# Patient Record
Sex: FEMALE | Race: WHITE | Employment: FULL TIME | URBAN - METROPOLITAN AREA
[De-identification: names, ages, dates, MRNs, and addresses within clinical notes are randomized per-mention and may not be internally consistent; named-entity substitution may affect disease eponyms.]

---

## 2017-11-09 ENCOUNTER — HOSPITAL ENCOUNTER (OUTPATIENT)
Dept: ENDOSCOPY | Age: 37
Discharge: HOME | End: 2017-11-09
Attending: INTERNAL MEDICINE | Admitting: INTERNAL MEDICINE

## 2017-11-09 LAB — HCG(URINE) PREGNANCY TEST: NEGATIVE

## 2017-11-09 RX ORDER — SODIUM CHLORIDE 0.9 % (FLUSH) 0.9 %
10 SYRINGE (ML) INJECTION EVERY 12 HOURS SCHEDULED
Status: CANCELLED | OUTPATIENT
Start: 2017-11-09

## 2017-11-09 RX ORDER — SODIUM CHLORIDE 9 MG/ML
INJECTION, SOLUTION INTRAVENOUS CONTINUOUS
Status: CANCELLED | OUTPATIENT
Start: 2017-11-09

## 2017-11-09 RX ORDER — SODIUM CHLORIDE 0.9 % (FLUSH) 0.9 %
10 SYRINGE (ML) INJECTION PRN
Status: CANCELLED | OUTPATIENT
Start: 2017-11-09

## 2017-11-09 ASSESSMENT — ENCOUNTER SYMPTOMS: SHORTNESS OF BREATH: 0

## 2017-11-09 NOTE — ANESTHESIA POST-OP
[facility]est[facility]iology  Post-Anesthesia Note       Name:  Gloria Gonzales                                         Age:  39 y.o. MRN:  [id number]     Last Vitals & Oxygen Saturation: There were no vitals taken for this visit. No data found. Level of consciousness:  Awake, alert    Respiratory: Respirations easy, no distress. Stable. Cardiovascular: Hemodynamically stable. Hydration: Adequate. PONV: Adequately managed. Post-op pain: Adequately controlled. Post-op assessment: Tolerated anesthetic well without complication. Complications:  None.     Emiliana Starr MD  November 9, 2017   1:27 PM

## 2017-11-09 NOTE — ANESTHESIA PRE-OP
complications:   Airway: Mallampati: II  TM distance: >3 FB   Neck ROM: full  Mouth opening: > = 3 FB Dental:          Pulmonary:       (-) pneumonia, COPD, asthma, shortness of breath, recent URI and sleep apnea                           Cardiovascular:  Exercise tolerance: good (>4 METS),       (-) pacemaker, hypertension, valvular problems/murmurs, past MI, CAD, CABG/stent, dysrhythmias,  angina,  CHF, orthopnea, PND and  CLEMENS      Rhythm: regular  Rate: normal                    Neuro/Psych:   (+) neuromuscular disease:,    (-) seizures, TIA, CVA, headaches and psychiatric history           GI/Hepatic/Renal:   (+) GERD:,      (-) hiatal hernia, PUD, hepatitis, liver disease and bowel prep       Endo/Other:    (+) : arthritis:. (-) hypothyroidism, hyperthyroidism, blood dyscrasia, no Type II DM, no Type I DM               Abdominal:           Vascular:                                      Anesthesia Plan      MAC     ASA 2       Induction: intravenous. Anesthetic plan and risks discussed with patient. Plan discussed with CRNA. DOS STAFF ADDENDUM:    Pt seen and examined, chart reviewed (including anesthesia, drug and allergy history). No interval changes to history and physical examination. Anesthetic plan, risks, benefits, alternatives, and personnel involved discussed with patient. Patient verbalized an understanding and agrees to proceed.       Shaun Ruelas MD  November 9, 2017  6:47 AM

## 2022-11-10 NOTE — PROGRESS NOTES
Place patient label inside box (if no patient label, complete below)  Name:  :  MR#:     Tyree Mart / PROCEDURE  I (we)Elaine Bing (Patient Name) authorize Dilma Ken DPM (Provider / Byron Amaya) and/or such assistants as may be selected by him/her, to perform the following operation/procedure(s): RIGHT 1ST METATARSAL PHALANGEAL JOINT ARTHRODESIS, REMOVAL OF HARDWARE       Note: If unable to obtain consent prior to an emergent procedure, document the emergent reason in the medical record. This procedure has been explained to my (our) satisfaction and included in the explanation was: The intended benefit, nature, and extent of the procedure to be performed; The significant risks involved and the probability of success; Alternative procedures and methods of treatment; The dangers and probable consequences of such alternatives (including no procedure or treatment); The expected consequences of the procedure on my future health; Whether other qualified individuals would be performing important surgical tasks and/or whether  would be present to advise or support the procedure. I (we) understand that there are other risks of infection and other serious complications in the pre-operative/procedural and postoperative/procedural stages of my (our) care. I (we) have asked all of the questions which I (we) thought were important in deciding whether or not to undergo treatment or diagnosis. These questions have been answered to my (our) satisfaction. I (we) understand that no assurance can be given that the procedure will be a success, and no guarantee or warranty of success has been given to me (us).     It has been explained to me (us) that during the course of the operation/procedure, unforeseen conditions may be revealed that necessitate extension of the original procedure(s) or different procedure(s) than those set forth in Paragraph 1. I (we) authorize and request that the above-named physician, his/her assistants or his/her designees, perform procedures as necessary and desirable if deemed to be in my (our) best interest.     Revised 8/2/2021                                                                          Page 1 of 2       I acknowledge that health care personnel may be observing this procedure for the purpose of medical education or other specified purposes as may be necessary as requested and/or approved by my (our) physician. I (we) consent to the disposal by the hospital Pathologist of the removed tissue, parts or organs in accordance with hospital policy. I do ____ do not ____ consent to the use of a local infiltration pain blocking agent that will be used by my provider/surgical provider to help alleviate pain during my procedure. I do ____ do not ____ consent to an emergent blood transfusion in the case of a life-threatening situation that requires blood components to be administered. This consent is valid for 24 hours from the beginning of the procedure. This patient does ____ or does not ____ currently have a DNR status/order. If DNR order is in place, obtain Addendum to the Surgical Consent for ALL Patients with a DNR Order to address iron-operative status for limited intervention or DNR suspension.      I have read and fully understand the above Consent for Operation/Procedure and that all blanks were completed before I signed the consent.   _____________________________       _____________________      ____/____am/pm  Signature of Patient or legal representative      Printed Name / Relationship            Date / Time   ____________________________       _____________________      ____/____am/pm  Witness to Signature                                    Printed Name                    Date / Time    If patient is unable to sign or is a minor, complete the following)  Patient is a minor, ____ years of age, or unable to sign because:   ______________________________________________________________________________________________    If a phone consent is obtained, consent will be documented by using two health care professionals, each affirming that the consenting party has no questions and gives consent for the procedure discussed with the physician/provider.   _____________________          ____________________       _____/_____am/pm   2nd witness to phone consent        Printed name           Date / Time    Informed Consent:  I have provided the explanation described above in section 1 to the patient and/or legal representative.  I have provided the patient and/or legal representative with an opportunity to ask any questions about the proposed operation/procedure.   ___________________________          ____________________         ____/____am/pm  Provider / Proceduralist                            Printed name            Date / Time  Revised 8/2/2021                                                                      Page 2 of 2

## 2022-11-11 ENCOUNTER — ANESTHESIA EVENT (OUTPATIENT)
Dept: OPERATING ROOM | Age: 42
End: 2022-11-11
Payer: COMMERCIAL

## 2022-11-11 RX ORDER — LISINOPRIL 40 MG/1
40 TABLET ORAL DAILY
COMMUNITY

## 2022-11-11 NOTE — PROGRESS NOTES
[facility] PRE-SURGICAL TESTING INSTRUCTIONS                      PRIOR TO PROCEDURE DATE:    1. PLEASE FOLLOW ANY INSTRUCTIONS GIVEN TO YOU PER YOUR SURGEON. 2. Arrange for someone to drive you home and be with you for the first 24 hours after discharge for your safety after your procedure for which you received sedation. Ensure it is someone we can share information with regarding your discharge. NOTE: At this time ONLY 2 ADULTS may accompany you   One person MUST stay at hospital entire time if outpatient surgery      3. You must contact your surgeon for instructions IF:  You are taking any blood thinners, aspirin, anti-inflammatory or vitamins. There is a change in your physical condition such as a cold, fever, rash, cuts, sores, or any other infection, especially near your surgical site. 4. Do not drink alcohol the day before or day of your procedure. Do not use any recreational marijuana at least 24 hours or street drugs (heroin, cocaine) at minimum 5 days prior to your procedure. 5. A Pre-Surgical History and Physical MUST be completed WITHIN 30 DAYS OR LESS prior to your procedure. by your Physician or an Urgent Care        THE DAY OF YOUR PROCEDURE:  1. Follow instructions for ARRIVAL TIME as DIRECTED BY YOUR SURGEON. 2. Enter the MAIN entrance from [employer] and follow the signs to the free Parking [employer] (offered free of charge 7 am-5pm). 3. Enter the Main Entrance of the hospital (do not enter from the lower level of the parking garage). Upon entrance, check in with the  at the surgical information desk on your LEFT. Bring your insurance card and photo ID to register      4. DO NOT EAT ANYTHING 8 hours prior to arrival for surgery. You may have up to 8 ounces of water 4 hours prior to your arrival for surgery.    NOTE: ALL Gastric, Bariatric & Bowel surgery patients - you MUST follow your surgeon's instructions regarding eating/ drinking as you will have very specific instructions to follow. If you did not receive these, call your surgeon's office immediately. 5. MEDICATIONS:  Take the following medications with a SMALL sip of water: XANAX NEXIUM NIFEDIPINE METOPROLOL  Use your usual dose of inhalers the morning of surgery. BRING your rescue inhaler with you to hospital.   Anesthesia does NOT want you to take insulin the morning of surgery. They will control your blood sugar while you are at the hospital. Please contact your ordering physician for instructions regarding your insulin the night before your procedure. If you have an insulin pump, please keep it set on basal rate. Bariatric patient's call your surgeon if on diabetic medications as some may need to be stopped 1 week prior to surgery    6. Do not swallow additional water when brushing teeth. No gum, candy, mints, or ice chips. Refrain from smoking or at least decrease the amount on day of surgery. 7. Morning of surgery:   Take a shower with an antibacterial soap (i.e., Safeguard or Dial) OR your physician may have instructed you to use Hibiclens. Dress in loose, comfortable clothing appropriate for redressing after your procedure. Do not wear jewelry (including body piercings), make-up (especially NO eye make-up), fingernail polish (NO toenail polish if foot/leg surgery), lotion, powders, or metal hairclips. Do not shave or wax for 72 hours prior to procedure near your operative site. Shaving with a razor can irritate your skin and make it easier to develop an infection. On the day of your procedure, any hair that needs to be removed near the surgical site will be 'clipped' by a healthcare worker using a special clipper designed to avoid skin irritation. 8. Dentures, glasses, or contacts will need to be removed before your procedure. Bring cases for your glasses, contacts, dentures, or hearing aids to protect them while you are in surgery.       9. If you use a CPAP, please bring it with you on the day of your procedure. 10. We recommend that valuable personal belongings such as cash, cell phones, e-tablets, or jewelry, be left at home during your stay. The hospital will not be responsible for valuables that are not secured in the hospital safe. However, if your insurance requires a co-pay, you may want to bring a method of payment, i.e., Check or credit card, if you wish to pay your co-pay the day of surgery. 11. If you are to stay overnight, you may bring a bag with personal items. Please have any large items you may need brought in by your family after your arrival to your hospital room. 12. If you have a Living Will or Durable Power of , please bring a copy on the day of your procedure. How we keep you safe and work to prevent surgical site infections:   1. Health care workers should always check your ID bracelet to verify your name and birth date. You will be asked many times to state your name, date of birth, and allergies. 2. Health care workers should always clean their hands with soap or alcohol gel before providing care to you. It is okay to ask anyone if they cleaned their hands before they touch you. 3. You will be actively involved in verifying the type of procedure you are having and ensuring the correct surgical site. This will be confirmed multiple times prior to your procedure. Do NOT maria dolores your surgery site UNLESS instructed to by your surgeon. 4. When you are in the operating room, your surgical site will be cleansed with a special soap, and in most cases, you will be given an antibiotic before the surgery begins. What to expect AFTER your procedure? 1. Immediately following your procedure, your will be taken to the PACU for the first phase of your recovery. Your nurse will help you recover from any potential side effects of anesthesia, such as extreme drowsiness, changes in your vital signs or breathing patterns. Nausea, headache, muscle aches, or sore throat may also occur after anesthesia. Your nurse will help you manage these potential side effects. 2. For comfort and safety, arrange to have someone at home with you for the first 24 hours after discharge. 3. You and your family will be given written instructions about your diet, activity, dressing care, medications, and return visits. 4. Once at home, should issues with nausea, pain, or bleeding occur, or should you notice any signs of infection, you should call your surgeon. 5. Always clean your hands before and after caring for your wound. Do not let your family touch your surgery site without cleaning their hands. 6. Narcotic pain medications can cause significant constipation. You may want to add a stool softener to your postoperative medication schedule or speak to your surgeon on how best to manage this SIDE EFFECT. SPECIAL INSTRUCTIONS NONE    Thank you for allowing us to care for you. We strive to exceed your expectations in the delivery of care and service provided to you and your family. If you need to contact the [place] [address] staff for any reason, please call us at [phone]    Instructions reviewed with patient during preadmission testing phone interview.   Jonn Koyanagi, RN.11/11/2022 .9:14 AM      ADDITIONAL EDUCATIONAL INFORMATION REVIEWED PER PHONE WITH YOU AND/OR YOUR FAMILY:  No Hibiclens® Bathing Instructions   Yes Antibacterial Soap

## 2022-11-14 ENCOUNTER — ANESTHESIA (OUTPATIENT)
Dept: OPERATING ROOM | Age: 42
End: 2022-11-14
Payer: COMMERCIAL

## 2022-11-14 ENCOUNTER — HOSPITAL ENCOUNTER (OUTPATIENT)
Age: 42
Setting detail: OUTPATIENT SURGERY
Discharge: HOME | End: 2022-11-14
Attending: PODIATRIST | Admitting: PODIATRIST
Payer: COMMERCIAL

## 2022-11-14 ENCOUNTER — APPOINTMENT (OUTPATIENT)
Dept: GENERAL RADIOLOGY | Age: 42
End: 2022-11-14
Attending: PODIATRIST
Payer: COMMERCIAL

## 2022-11-14 VITALS
BODY MASS INDEX: 25.56 KG/M2 | HEIGHT: 60 IN | RESPIRATION RATE: 14 BRPM | HEART RATE: 74 BPM | WEIGHT: 130.2 LBS | OXYGEN SATURATION: 97 % | TEMPERATURE: 97.7 F | DIASTOLIC BLOOD PRESSURE: 88 MMHG | SYSTOLIC BLOOD PRESSURE: 119 MMHG

## 2022-11-14 DIAGNOSIS — G89.18 POST-OP PAIN: Primary | ICD-10-CM

## 2022-11-14 LAB — PREGNANCY, URINE: NEGATIVE

## 2022-11-14 PROCEDURE — 2580000003 HC RX 258: Performed by: NURSE ANESTHETIST, CERTIFIED REGISTERED

## 2022-11-14 PROCEDURE — C1713 ANCHOR/SCREW BN/BN,TIS/BN: HCPCS | Performed by: PODIATRIST

## 2022-11-14 PROCEDURE — 2580000003 HC RX 258: Performed by: PODIATRIST

## 2022-11-14 PROCEDURE — 7100000010 HC PHASE II RECOVERY - FIRST 15 MIN: Performed by: PODIATRIST

## 2022-11-14 PROCEDURE — 3600000014 HC SURGERY LEVEL 4 ADDTL 15MIN: Performed by: PODIATRIST

## 2022-11-14 PROCEDURE — 2500000003 HC RX 250 WO HCPCS: Performed by: NURSE ANESTHETIST, CERTIFIED REGISTERED

## 2022-11-14 PROCEDURE — 6360000002 HC RX W HCPCS: Performed by: ANESTHESIOLOGY

## 2022-11-14 PROCEDURE — 2500000003 HC RX 250 WO HCPCS: Performed by: PODIATRIST

## 2022-11-14 PROCEDURE — 6360000002 HC RX W HCPCS: Performed by: PODIATRIST

## 2022-11-14 PROCEDURE — 6370000000 HC RX 637 (ALT 250 FOR IP)

## 2022-11-14 PROCEDURE — 2709999900 HC NON-CHARGEABLE SUPPLY: Performed by: PODIATRIST

## 2022-11-14 PROCEDURE — 2720000010 HC SURG SUPPLY STERILE: Performed by: PODIATRIST

## 2022-11-14 PROCEDURE — 3600000004 HC SURGERY LEVEL 4 BASE: Performed by: PODIATRIST

## 2022-11-14 PROCEDURE — 7100000011 HC PHASE II RECOVERY - ADDTL 15 MIN: Performed by: PODIATRIST

## 2022-11-14 PROCEDURE — 73630 X-RAY EXAM OF FOOT: CPT

## 2022-11-14 PROCEDURE — 3700000001 HC ADD 15 MINUTES (ANESTHESIA): Performed by: PODIATRIST

## 2022-11-14 PROCEDURE — 2580000003 HC RX 258: Performed by: ANESTHESIOLOGY

## 2022-11-14 PROCEDURE — 84703 CHORIONIC GONADOTROPIN ASSAY: CPT

## 2022-11-14 PROCEDURE — 6360000002 HC RX W HCPCS: Performed by: NURSE ANESTHETIST, CERTIFIED REGISTERED

## 2022-11-14 PROCEDURE — 3700000000 HC ANESTHESIA ATTENDED CARE: Performed by: PODIATRIST

## 2022-11-14 PROCEDURE — 7100000001 HC PACU RECOVERY - ADDTL 15 MIN: Performed by: PODIATRIST

## 2022-11-14 PROCEDURE — C1769 GUIDE WIRE: HCPCS | Performed by: PODIATRIST

## 2022-11-14 PROCEDURE — 7100000000 HC PACU RECOVERY - FIRST 15 MIN: Performed by: PODIATRIST

## 2022-11-14 DEVICE — LOCKING SCREW
Type: IMPLANTABLE DEVICE | Site: FOOT | Status: FUNCTIONAL
Brand: ANCHORAGE

## 2022-11-14 DEVICE — INJECTABLE KIT
Type: IMPLANTABLE DEVICE | Site: FOOT | Status: FUNCTIONAL
Brand: AUGMENT® INJECTABLE

## 2022-11-14 DEVICE — GRAFT BNE SUB 5CC VIABLE MTRX COMPRESSIBLE MOLD RDY TO USE: Type: IMPLANTABLE DEVICE | Site: FOOT | Status: FUNCTIONAL

## 2022-11-14 DEVICE — IMPLANTABLE DEVICE: Type: IMPLANTABLE DEVICE | Site: FOOT | Status: FUNCTIONAL

## 2022-11-14 DEVICE — HEADED SCREW
Type: IMPLANTABLE DEVICE | Site: FOOT | Status: FUNCTIONAL
Brand: MINI

## 2022-11-14 DEVICE — NON LOCKING SCREW
Type: IMPLANTABLE DEVICE | Site: FOOT | Status: FUNCTIONAL
Brand: ANCHORAGE

## 2022-11-14 DEVICE — PIN ANCHORAGE FIX: Type: IMPLANTABLE DEVICE | Site: FOOT | Status: FUNCTIONAL

## 2022-11-14 RX ORDER — MIDAZOLAM HYDROCHLORIDE 1 MG/ML
INJECTION INTRAMUSCULAR; INTRAVENOUS PRN
Status: DISCONTINUED | OUTPATIENT
Start: 2022-11-14 | End: 2022-11-14 | Stop reason: SDUPTHER

## 2022-11-14 RX ORDER — OXYCODONE HYDROCHLORIDE AND ACETAMINOPHEN 5; 325 MG/1; MG/1
1 TABLET ORAL EVERY 6 HOURS PRN
Qty: 28 TABLET | Refills: 0 | Status: SHIPPED | OUTPATIENT
Start: 2022-11-14 | End: 2022-11-21

## 2022-11-14 RX ORDER — FENTANYL CITRATE 50 UG/ML
INJECTION, SOLUTION INTRAMUSCULAR; INTRAVENOUS PRN
Status: DISCONTINUED | OUTPATIENT
Start: 2022-11-14 | End: 2022-11-14 | Stop reason: SDUPTHER

## 2022-11-14 RX ORDER — SODIUM CHLORIDE 0.9 % (FLUSH) 0.9 %
5-40 SYRINGE (ML) INJECTION EVERY 12 HOURS SCHEDULED
Status: DISCONTINUED | OUTPATIENT
Start: 2022-11-14 | End: 2022-11-14 | Stop reason: HOSPADM

## 2022-11-14 RX ORDER — IBUPROFEN 800 MG/1
800 TABLET ORAL 3 TIMES DAILY PRN
Qty: 90 TABLET | Refills: 0 | Status: SHIPPED | OUTPATIENT
Start: 2022-11-14

## 2022-11-14 RX ORDER — SODIUM CHLORIDE, SODIUM LACTATE, POTASSIUM CHLORIDE, CALCIUM CHLORIDE 600; 310; 30; 20 MG/100ML; MG/100ML; MG/100ML; MG/100ML
INJECTION, SOLUTION INTRAVENOUS CONTINUOUS PRN
Status: DISCONTINUED | OUTPATIENT
Start: 2022-11-14 | End: 2022-11-14 | Stop reason: SDUPTHER

## 2022-11-14 RX ORDER — SODIUM CHLORIDE 0.9 % (FLUSH) 0.9 %
5-40 SYRINGE (ML) INJECTION PRN
Status: DISCONTINUED | OUTPATIENT
Start: 2022-11-14 | End: 2022-11-14 | Stop reason: HOSPADM

## 2022-11-14 RX ORDER — SODIUM CHLORIDE 9 MG/ML
INJECTION, SOLUTION INTRAVENOUS PRN
Status: DISCONTINUED | OUTPATIENT
Start: 2022-11-14 | End: 2022-11-14 | Stop reason: HOSPADM

## 2022-11-14 RX ORDER — PROPOFOL 10 MG/ML
INJECTION, EMULSION INTRAVENOUS PRN
Status: DISCONTINUED | OUTPATIENT
Start: 2022-11-14 | End: 2022-11-14 | Stop reason: SDUPTHER

## 2022-11-14 RX ORDER — DEXAMETHASONE SODIUM PHOSPHATE 10 MG/ML
INJECTION, SOLUTION INTRAMUSCULAR; INTRAVENOUS PRN
Status: DISCONTINUED | OUTPATIENT
Start: 2022-11-14 | End: 2022-11-14 | Stop reason: SDUPTHER

## 2022-11-14 RX ORDER — HYDRALAZINE HYDROCHLORIDE 20 MG/ML
10 INJECTION INTRAMUSCULAR; INTRAVENOUS
Status: DISCONTINUED | OUTPATIENT
Start: 2022-11-14 | End: 2022-11-14 | Stop reason: HOSPADM

## 2022-11-14 RX ORDER — LIDOCAINE HYDROCHLORIDE 10 MG/ML
INJECTION, SOLUTION EPIDURAL; INFILTRATION; INTRACAUDAL; PERINEURAL PRN
Status: DISCONTINUED | OUTPATIENT
Start: 2022-11-14 | End: 2022-11-14 | Stop reason: HOSPADM

## 2022-11-14 RX ORDER — PROCHLORPERAZINE EDISYLATE 5 MG/ML
5 INJECTION INTRAMUSCULAR; INTRAVENOUS
Status: DISCONTINUED | OUTPATIENT
Start: 2022-11-14 | End: 2022-11-14 | Stop reason: HOSPADM

## 2022-11-14 RX ORDER — LIDOCAINE HYDROCHLORIDE 20 MG/ML
INJECTION, SOLUTION INFILTRATION; PERINEURAL PRN
Status: DISCONTINUED | OUTPATIENT
Start: 2022-11-14 | End: 2022-11-14 | Stop reason: SDUPTHER

## 2022-11-14 RX ORDER — FAMOTIDINE 10 MG/ML
INJECTION, SOLUTION INTRAVENOUS PRN
Status: DISCONTINUED | OUTPATIENT
Start: 2022-11-14 | End: 2022-11-14 | Stop reason: SDUPTHER

## 2022-11-14 RX ORDER — OXYCODONE HYDROCHLORIDE AND ACETAMINOPHEN 5; 325 MG/1; MG/1
1 TABLET ORAL
Status: COMPLETED | OUTPATIENT
Start: 2022-11-14 | End: 2022-11-14

## 2022-11-14 RX ORDER — MEPERIDINE HYDROCHLORIDE 25 MG/ML
12.5 INJECTION INTRAMUSCULAR; INTRAVENOUS; SUBCUTANEOUS AS NEEDED
Status: DISCONTINUED | OUTPATIENT
Start: 2022-11-14 | End: 2022-11-14 | Stop reason: HOSPADM

## 2022-11-14 RX ORDER — ONDANSETRON 4 MG/1
4 TABLET, FILM COATED ORAL EVERY 8 HOURS PRN
Qty: 21 TABLET | Refills: 0 | Status: SHIPPED | OUTPATIENT
Start: 2022-11-14 | End: 2022-11-21

## 2022-11-14 RX ORDER — ROCURONIUM BROMIDE 10 MG/ML
INJECTION, SOLUTION INTRAVENOUS PRN
Status: DISCONTINUED | OUTPATIENT
Start: 2022-11-14 | End: 2022-11-14 | Stop reason: SDUPTHER

## 2022-11-14 RX ORDER — ONDANSETRON 2 MG/ML
INJECTION INTRAMUSCULAR; INTRAVENOUS PRN
Status: DISCONTINUED | OUTPATIENT
Start: 2022-11-14 | End: 2022-11-14 | Stop reason: SDUPTHER

## 2022-11-14 RX ORDER — GABAPENTIN 300 MG/1
300 CAPSULE ORAL NIGHTLY
Qty: 30 CAPSULE | Refills: 0 | Status: SHIPPED | OUTPATIENT
Start: 2022-11-14 | End: 2022-12-14

## 2022-11-14 RX ORDER — DIPHENHYDRAMINE HYDROCHLORIDE 50 MG/ML
12.5 INJECTION INTRAMUSCULAR; INTRAVENOUS
Status: DISCONTINUED | OUTPATIENT
Start: 2022-11-14 | End: 2022-11-14 | Stop reason: HOSPADM

## 2022-11-14 RX ORDER — ONDANSETRON 2 MG/ML
4 INJECTION INTRAMUSCULAR; INTRAVENOUS
Status: COMPLETED | OUTPATIENT
Start: 2022-11-14 | End: 2022-11-14

## 2022-11-14 RX ORDER — SODIUM CHLORIDE, SODIUM LACTATE, POTASSIUM CHLORIDE, CALCIUM CHLORIDE 600; 310; 30; 20 MG/100ML; MG/100ML; MG/100ML; MG/100ML
INJECTION, SOLUTION INTRAVENOUS CONTINUOUS
Status: DISCONTINUED | OUTPATIENT
Start: 2022-11-14 | End: 2022-11-14 | Stop reason: HOSPADM

## 2022-11-14 RX ADMIN — LIDOCAINE HYDROCHLORIDE 100 MG: 20 INJECTION, SOLUTION INFILTRATION; PERINEURAL at 07:33

## 2022-11-14 RX ADMIN — FENTANYL CITRATE 100 MCG: 50 INJECTION, SOLUTION INTRAMUSCULAR; INTRAVENOUS at 10:23

## 2022-11-14 RX ADMIN — SODIUM CHLORIDE, SODIUM LACTATE, POTASSIUM CHLORIDE, AND CALCIUM CHLORIDE: .6; .31; .03; .02 INJECTION, SOLUTION INTRAVENOUS at 07:28

## 2022-11-14 RX ADMIN — PROPOFOL 50 MG: 10 INJECTION, EMULSION INTRAVENOUS at 07:40

## 2022-11-14 RX ADMIN — SODIUM CHLORIDE, SODIUM LACTATE, POTASSIUM CHLORIDE, AND CALCIUM CHLORIDE: .6; .31; .03; .02 INJECTION, SOLUTION INTRAVENOUS at 10:26

## 2022-11-14 RX ADMIN — ONDANSETRON 4 MG: 2 INJECTION INTRAMUSCULAR; INTRAVENOUS at 07:40

## 2022-11-14 RX ADMIN — ROCURONIUM BROMIDE 100 MG: 10 INJECTION INTRAVENOUS at 07:35

## 2022-11-14 RX ADMIN — CEFAZOLIN 2000 MG: 2 INJECTION, POWDER, FOR SOLUTION INTRAMUSCULAR; INTRAVENOUS at 07:28

## 2022-11-14 RX ADMIN — PROPOFOL 150 MG: 10 INJECTION, EMULSION INTRAVENOUS at 07:35

## 2022-11-14 RX ADMIN — FAMOTIDINE 20 MG: 10 INJECTION, SOLUTION INTRAVENOUS at 07:44

## 2022-11-14 RX ADMIN — SODIUM CHLORIDE, POTASSIUM CHLORIDE, SODIUM LACTATE AND CALCIUM CHLORIDE: 600; 310; 30; 20 INJECTION, SOLUTION INTRAVENOUS at 06:45

## 2022-11-14 RX ADMIN — MIDAZOLAM HYDROCHLORIDE 2 MG: 2 INJECTION, SOLUTION INTRAMUSCULAR; INTRAVENOUS at 07:28

## 2022-11-14 RX ADMIN — FENTANYL CITRATE 100 MCG: 50 INJECTION, SOLUTION INTRAMUSCULAR; INTRAVENOUS at 07:28

## 2022-11-14 RX ADMIN — ONDANSETRON 4 MG: 2 INJECTION INTRAMUSCULAR; INTRAVENOUS at 11:24

## 2022-11-14 RX ADMIN — DEXAMETHASONE SODIUM PHOSPHATE 4 MG: 10 INJECTION, SOLUTION INTRAMUSCULAR; INTRAVENOUS at 07:40

## 2022-11-14 RX ADMIN — OXYCODONE HYDROCHLORIDE AND ACETAMINOPHEN 1 TABLET: 5; 325 TABLET ORAL at 11:39

## 2022-11-14 RX ADMIN — SUGAMMADEX 200 MG: 100 INJECTION, SOLUTION INTRAVENOUS at 10:17

## 2022-11-14 ASSESSMENT — PAIN SCALES - GENERAL
PAINLEVEL_OUTOF10: 2
PAINLEVEL_OUTOF10: 4
PAINLEVEL_OUTOF10: 3
PAINLEVEL_OUTOF10: 0

## 2022-11-14 ASSESSMENT — PAIN DESCRIPTION - LOCATION
LOCATION: FOOT

## 2022-11-14 ASSESSMENT — PAIN DESCRIPTION - ORIENTATION
ORIENTATION: RIGHT

## 2022-11-14 ASSESSMENT — PAIN DESCRIPTION - DESCRIPTORS
DESCRIPTORS: SORE
DESCRIPTORS: ACHING

## 2022-11-14 ASSESSMENT — LIFESTYLE VARIABLES: SMOKING_STATUS: 0

## 2022-11-14 NOTE — DISCHARGE INSTRUCTIONS
Dr. Ovidio Roque, S-[facility] [id number]  [address] [phone]    Post-Op Discharge Instructions    Fluids and Diet  1. Begin with clear liquids, bouillon, dry toast, soda crackers  2. If NOT nauseated, you may resume a regular diet when you desire    Medications  1. Take prescription medications only as directed  2. If pain is not severe, you may take the non-prescription medication that you normally take. 3. If any side effects or adverse reactions occur, discontinue the medication and contact your doctor. 4. Review the patient drug information leaflet, when provided, before you begin taking the medication    Ambulation and Activity  1. You are advised to go directly home from the hospital  2. YOU MAY NOT PUT ANY WEIGHT ON YOUR OPERATIVE FOOT. 3. Please use your crutches, knee scooter, walker  for assistance. Wear your surgical shoe or boot at all times   4. Do not lift or move heavy objects  5. Do not Drive    Post Anesthesia Instructions  1. Do not drive or operate machinery  2. Do not consume alcohol, tranquilizers, sleeping medications, or a non-prescription pain medication  3. Do not make important decisions  4. You should have someone home with you tonight    Care of the Operative Site  1. Keep your bandage clean, dry, and intact. 2. Do not shower  3. Do not remove bandage  4. Elevate your surgical foot and leg as much as possible to reduce swelling and discomfort for the first 72 hours, above the level of the heart. 5. Apply ice bag wrapped in thick towel over bandage 20 minutes of every hour for the first 72 hours while awake. If the dressing is too thick to feel the ice, you may place the ice behind the knee of your surgical foot.   6. Do not attempt to put anything between the cast or dressing and skin, some itching is normal    Special Instructions:  [address]    There are potential side effects of anesthesia or sedation you may experience for the first 24 hours. These side effects include:    Confusion or Memory loss, Dizziness, or Delayed Reaction Times   [x]A responsible person should be with you for the next 24 hours. Do not operate any vehicles (automobiles, bicycles, motorcycles) or power tools or machinery for 24 hours. Do not sign any legal documents or make any legal decisions for 24 hours. Do not drink alcohol for 24 hours or while taking narcotic pain medication. Nausea    [x]Start with light diet and progress to your normal diet as you feel like eating. However, if you experience nausea or repeated episodes of vomiting which persist beyond 12-24 hours, notify your physician. Once nausea has passed, remember to keep drinking fluids. Difficulty Passing Urine  [x]Drink extra amounts of fluid today. Notify your physician if you have not urinated within 8 hours after your procedure or you feel uncomfortable. Irritated Throat from a Breathing Tube  [x]Drink extra amounts of fluid today. Lozenges may help. Muscle Aches  [x]You may experience some generalized body aches as your muscles recover from medications used to relax them during surgery. These will gradually subside. MEDICATION INSTRUCTIONS:  [x]Prescription(S) x  5  sent with you. Use as directed. When taking pain medications, you may experience the side effect of dizziness or drowsiness. Do not drink alcohol or drive when taking these medications. []Prescription(S) x          Called to Pharmacy Name and location:    [x]Give the list of your medications to your primary care physician on your next visit. Keep your med list updated and carry it with in case of emergencies. [x] Narcotic pain medications can cause the side effect of significant constipation.   You may want to add a stool softener to your postoperative medication schedule or speak to your surgeon on how best to manage this side effect. NARCOTIC SAFETY:  Your pain medicine is only for you to take. Safely store your medicines. Store pills up high and out of reach of children and pets. Ensure safety caps are snapped tightly  Keep track of how many pills you have left    Unused medication can be disposed of by taking them to a drop-off box or take-back program that is authorized by the [facility]. Access to a site near you can be found on the [facility] website ([address] [facility]. [facility]). If you have a CPAP machine, it is very important that you use it daily during all periods of sleep and daytime rest during your recovery at home. Surgery and Anesthesia place a significant amount of stress on your body. Using your CPAP will help keep you safe and lessen the negative effects of that stress. FOLLOW-UP RECOVERY CARE:  [x]Call the office at [phone]  for follow-up appointment 1 week and problems    Watch for these possible complications, symptoms, or side effects of anesthesia. Call physician if they or any other problems occur:  Signs of INFECTION   > Fever over 100°     > Redness, swelling, hardness or warmth at the operative site   >Foul smelling or cloudy drainage at the operative site   Unrelieved PAIN  Unrelieved NAUSEA  Blood soaked dressing. (Some oozing may be normal)  Inability to urinate      Numb, pale, blue, cold or tingling extremity      Physician:  dr. Karen Dick    The above instructions were reviewed with patient/significant other.   The following additional patient specific information was reviewed with the patient/significant other:  [x]Procedure/physician specific instructions  [x]Medication information sheet(S) including potential side effects  []Azebs egress test  []Pain Ball management  []FAQ Catheter associated blood stream infections  []FAQ Surgical Site Infections  []Other-    I have read and understand the instructions given to me: ____________________________________________ (Patient/S.O. Signature)            Date/time 2022 11:12 AM         PACU:  [phone]   M-F 700 AM - 7 PM      SAME DAY SERVICES:  [phone] M-F 7AM-6PM        If you smoke STOP. We care about your health! Call your doctor immediately if you develop any of the followin. Fever over 100 degrees by mouth - take your temperature daily  2. Pain not relieved by medication ordered  3. Swelling, increased redness, warmth, or hardness around operative area  4. Numb, tingling or cold toes  5. Toe(s) become white or bluish  6. Bandage becomes wet, soiled, or blood soaked (a small amount of bleeding may be normal)  7. Increasing or progressive drainage from surgical area    Please follow up at your scheduled appointment. If you do not have an appointment scheduled or cannot remember the time and date, please call Dr. Rosana Min office to get it scheduled. You need to be seen within one week of surgery. Please call Dr. Rosana Min office at [phone] if you have any questions.

## 2022-11-14 NOTE — H&P
[facility]    [id number]    [facility] Same Day Surgery Update H & P  Department of General Surgery   Surgical Service   Pre-operative History and Physical  Last H & P within the last 30 days. DIAGNOSIS:   Hallux valgus, right [M20.11]    Procedure(s):  RIGHT 1ST METATARSAL PHALANGEAL JOINT ARTHRODESIS, REMOVAL OF HARDWARE  . History obtained from: Patient interview and EHR     HISTORY OF PRESENT ILLNESS:   The patient is a 36 y.o. female with c/o right foot pain in the setting of previous surgery. Their symptoms have been recalcitrant to conservative treatment and the patient presents today for the above procedure. Illness Screening: Patient denies fever, chills, worsening cough, or close contact with sick individuals. Past Medical History:        Diagnosis Date    Anxiety     Cancer ([address]     carcinoid tumor colon    Carcinoid syndrome ([address]     causes fever, chills, sweats, rapid heart rate    Esophageal disorder     \"does not contract properly\"    GERD (gastroesophageal reflux disease)     History of aspiration pneumonitis     Mixed connective tissue disease (HCC)     Nausea     chronic    Nausea & vomiting     Polymyositis ([address]     Prolonged emergence from general anesthesia      Past Surgical History:        Procedure Laterality Date    ADENOIDECTOMY      APPENDECTOMY  2016    AT [address]  01/01/2010    reduction    BUNIONECTOMY Right 2018    WITH HARDWARE R FOOT    CHOLECYSTECTOMY      LIVER SURGERY  10/17/2013    SMALL INTESTINE SURGERY  10/17/2013    TUMOR EXCISION  01/01/2008    AND 2ND SURGERY  OCT 27 2022  cholestiatoma right ear    TYMPANOSTOMY TUBE PLACEMENT             Medications Prior to Admission:      Prior to Admission medications    Medication Sig Start Date End Date Taking?  Authorizing Provider   Norgestim-Eth Estrad Triphasic (TRI-SPRINTEC PO) Take by mouth   Yes Historical Provider, MD   lisinopril (PRINIVIL;ZESTRIL) 40 MG tablet Take 40 mg by mouth daily   Yes Historical Provider, MD   METOPROLOL TARTRATE PO Take 25 mg by mouth Metoprolol xr   Yes Historical Provider, MD   FOLIC ACID PO Take 1 g by mouth daily   Yes Historical Provider, MD   Hydroxychloroquine Sulfate (PLAQUENIL PO) Take 200 mg by mouth   Yes Historical Provider, MD   esomeprazole (NEXIUM) 40 MG capsule TAKE 1 CAPSULE BY MOUTH TWO TIMES DAILY 8/16/16   Alita Cowden, MD   NIFEdipine (PROCARDIA XL) 30 MG CR tablet TAKE 1 TABLET BY MOUTH DAILY. 3/7/16   Alita Cowden, MD   ALPRAZolam [facility]) 1 MG tablet Take 1 mg by mouth as needed. Mostly at hs    Historical Provider, MD         Allergies:  Contrast [iodides], Ancef [cefazolin], and Morphine    PHYSICAL EXAM:      BP (!) 158/108   Pulse 81   Temp 98.6 °F (37 °C) (Oral)   Resp 16   Ht 5' (1.524 m)   Wt 130 lb 3.2 oz (59.1 kg)   LMP 10/31/2022   SpO2 100%   BMI 25.43 kg/m²      Airway:  Airway patent with no audible stridor    Heart:  Regular rate and rhythm, No murmur noted    Lungs:  No increased work of breathing, good air exchange, clear to auscultation bilaterally, no crackles or wheezing    Abdomen:  Soft, non-distended, non-tender, no masses palpated    ASSESSMENT AND PLAN    Patient is a 36 y.o. female with above specified procedure planned. 1.  The patients history and physical was obtained and signed off by the pre-admission testing department. Patient seen and focused exam done today- no new changes since last physical exam on 10/20/22    2. Access to ancillary services are available per request of the provider.     YOSVANY Bach - CNP     11/14/2022

## 2022-11-14 NOTE — ANESTHESIA PRE PROCEDURE
Department of Anesthesiology  Preprocedure Note       Name:  Nadiya Common   Age:  36 y.o.  :  1980                                          MRN:  [id number]         Date:  2022      Surgeon: Killian Chery):  Jeffrey Shea DPM    Procedure: Procedure(s):  RIGHT 1ST METATARSAL PHALANGEAL JOINT ARTHRODESIS, REMOVAL OF HARDWARE  . Medications prior to admission:   Prior to Admission medications    Medication Sig Start Date End Date Taking? Authorizing Provider   Norgestim-Eth Estrad Triphasic (TRI-SPRINTEC PO) Take by mouth   Yes Historical Provider, MD   lisinopril (PRINIVIL;ZESTRIL) 40 MG tablet Take 40 mg by mouth daily   Yes Historical Provider, MD   METOPROLOL TARTRATE PO Take 25 mg by mouth Metoprolol xr   Yes Historical Provider, MD   FOLIC ACID PO Take 1 g by mouth daily   Yes Historical Provider, MD   Hydroxychloroquine Sulfate (PLAQUENIL PO) Take 200 mg by mouth   Yes Historical Provider, MD   esomeprazole (NEXIUM) 40 MG capsule TAKE 1 CAPSULE BY MOUTH TWO TIMES DAILY 16   José Manuel Palmer MD   NIFEdipine (PROCARDIA XL) 30 MG CR tablet TAKE 1 TABLET BY MOUTH DAILY. 3/7/16   José Manuel Palmer MD   ALPRAZolam [facility]an) 1 MG tablet Take 1 mg by mouth as needed.  Mostly at hs    Historical Provider, MD       Current medications:    Current Facility-Administered Medications   Medication Dose Route Frequency Provider Last Rate Last Admin    ceFAZolin (ANCEF) 2,000 mg in sodium chloride 0.9 % 50 mL IVPB (mini-bag)  2,000 mg IntraVENous Once Jeffrey Shea DPM        lactated ringers infusion   IntraVENous Continuous Thurl Sand,  mL/hr at 22 0645 New Bag at 22 0645    sodium chloride flush 0.9 % injection 5-40 mL  5-40 mL IntraVENous 2 times per day Thurl Sand, DO        sodium chloride flush 0.9 % injection 5-40 mL  5-40 mL IntraVENous PRN Thurl Sand, DO        0.9 % sodium chloride infusion   IntraVENous PRN Thurl Sand, DO           Allergies: Allergies   Allergen Reactions    Contrast [Iodides]      Iv contrast dye only, analyphlaxis. Iodine on skin ok    Ancef [Cefazolin]      PATIENT STATES- \"Ok with cephalasporins. \"    Ancef iv extravasated was the issue -previously document    Morphine Itching     Peeling skin, itching         Problem List:    Patient Active Problem List   Diagnosis Code    Polymyositis ([address] M33.20    Anxiety state F41.1    Chronic steroid use [id number]    Small bowel mass K63.89    Liver mass, right lobe R16.0    Carcinoid tumor D3A.00       Past Medical History:        Diagnosis Date    Anxiety     Cancer [facility])     carcinoid tumor colon    Carcinoid syndrome (HCC)     causes fever, chills, sweats, rapid heart rate    Esophageal disorder     \"does not contract properly\"    GERD (gastroesophageal reflux disease)     History of aspiration pneumonitis     Mixed connective tissue disease ([address]     Nausea     chronic    Nausea & vomiting     Polymyositis ([address]     Prolonged emergence from general anesthesia        Past Surgical History:        Procedure Laterality Date    ADENOIDECTOMY      APPENDECTOMY  2016    AT [address] [facility]  01/01/2010    reduction    BUNIONECTOMY Right 2018    WITH HARDWARE R FOOT    CHOLECYSTECTOMY      EAR SURGERY      Cholesteatoma removed X2    LIVER SURGERY  10/17/2013    SMALL INTESTINE SURGERY  10/17/2013    TUMOR EXCISION  01/01/2008    AND 2ND SURGERY  OCT 27 2022  cholestiatoma right ear    TYMPANOSTOMY TUBE PLACEMENT         Social History:    Social History     Tobacco Use    Smoking status: Never    Smokeless tobacco: Never   Substance Use Topics    Alcohol use: Yes     Comment: Occasionally                                Counseling given: Not Answered      Vital Signs (Current):   Vitals:    11/11/22 0843 11/14/22 0600   BP:  (!) 158/108   Pulse:  81   Resp:  16   Temp:  98.6 °F (37 °C)   TempSrc:  Oral   SpO2:  100%   Weight: 130 lb (59 kg) 130 lb 3.2 oz (59.1 kg)   Height: 5' (1.524 m) 5' (1.524 m)                                              BP Readings from Last 3 Encounters:   11/14/22 (!) 158/108   12/01/16 130/86   08/11/16 132/86       NPO Status: Time of last liquid consumption: 0530 (Sip of water)                        Time of last solid consumption: 2000                        Date of last liquid consumption: 11/14/22                        Date of last solid food consumption: 11/13/22    BMI:   Wt Readings from Last 3 Encounters:   11/14/22 130 lb 3.2 oz (59.1 kg)   12/01/16 119 lb (54 kg)   08/11/16 124 lb 3.2 oz (56.3 kg)     Body mass index is 25.43 kg/m². CBC:   Lab Results   Component Value Date/Time    WBC 4.1 04/23/2015 03:39 PM    WBC 5.1 10/22/2013 09:38 AM    RBC 4.63 04/23/2015 03:39 PM    HGB 12.0 04/23/2015 03:39 PM    HCT 38.5 04/23/2015 03:39 PM    MCV 83.2 04/23/2015 03:39 PM    RDW 13.8 04/23/2015 03:39 PM     04/23/2015 03:39 PM       CMP:   Lab Results   Component Value Date/Time     04/23/2015 03:39 PM    K 4.0 04/23/2015 03:39 PM     04/23/2015 03:39 PM    CO2 26 04/23/2015 03:39 PM    BUN 10 04/23/2015 03:39 PM    CREATININE 0.71 04/23/2015 03:39 PM    GFRAA >60 10/22/2013 09:38 AM    GFRAA >60 05/20/2013 12:30 PM    AGRATIO 1.2 04/23/2015 03:39 PM    LABGLOM >60.0 04/23/2015 03:39 PM    GLUCOSE 89 04/23/2015 03:39 PM    PROT 8.2 04/23/2015 03:39 PM    CALCIUM 9.0 04/23/2015 03:39 PM    BILITOT 0.1 04/23/2015 03:39 PM    ALKPHOS 70 04/23/2015 03:39 PM    ALKPHOS 65 10/19/2013 05:18 AM    AST 19 04/23/2015 03:39 PM    ALT 18 04/23/2015 03:39 PM       POC Tests: No results for input(s): POCGLU, POCNA, POCK, POCCL, POCBUN, POCHEMO, POCHCT in the last 72 hours.     Coags:   Lab Results   Component Value Date/Time    PROTIME 12.7 10/11/2013 02:48 PM    INR 0.97 10/11/2013 02:48 PM    APTT 31.6 10/11/2013 02:48 PM       HCG (If Applicable):   Lab Results   Component Value Date    PREGTESTUR Negative 11/14/2022 ABGs: No results found for: PHART, PO2ART, RBC4LDP, LGI9HVA, BEART, E1OEJFBC     Type & Screen (If Applicable):  No results found for: LABABO, LABRH    Drug/Infectious Status (If Applicable):  No results found for: HIV, HEPCAB    COVID-19 Screening (If Applicable): No results found for: COVID19        Anesthesia Evaluation  Patient summary reviewed and Nursing notes reviewed no history of anesthetic complications:   Airway: Mallampati: II  TM distance: <3 FB   Neck ROM: full  Mouth opening: > = 3 FB   Dental: normal exam         Pulmonary: breath sounds clear to auscultation      (-) not a current smoker (never)                          ROS comment: covid 2 months ago     Cardiovascular:  Exercise tolerance: good (>4 METS),       (-) past MI      Rhythm: regular  Rate: normal           Beta Blocker:  Dose within 24 Hrs         Neuro/Psych:               GI/Hepatic/Renal:   (+) GERD: well controlled,           Endo/Other:    (+) : arthritis:., malignancy/cancer (hx of carcinoid colon   2015 removed  ). Abdominal:             Vascular: Other Findings:           Anesthesia Plan      general     ASA 3       Induction: intravenous. MIPS: Prophylactic antiemetics administered. Anesthetic plan and risks discussed with patient. Plan discussed with CRNA.     Attending anesthesiologist reviewed and agrees with Preprocedure content                Nestor Ocampo DO   11/14/2022

## 2022-11-14 NOTE — PROGRESS NOTES
Pt arrived form OR, s/p RIGHT 1ST METATARSAL PHALANGEAL JOINT ARTHRODESIS, REMOVAL OF HARDWARE - Right, report received form CRNA and Podiatry resident.   Hob elevated, ice chips offered

## 2022-11-14 NOTE — PROGRESS NOTES
Ambulatory Surgery/Procedure Discharge Note    Vitals:    11/14/22 1156   BP: 119/88   Pulse: 74   Resp: 14   Temp: 97.7 °F (36.5 °C)   SpO2: 97%   Blood pressure is lower then pre-op BP. Pt reports she is much more relaxed and her pain is in control. She did not feel light headed or dizzy after surgery. In: 3120 [P.O.:250; I.V.:1075]  Out: 600 [Urine:600]    Restroom use offered before discharge. Yes    Pain assessment:  none  Pain Level: 3        Patient discharged to home/self care. Patient discharged via wheel chair by transporter to waiting family/S.O.       11/14/2022 12:49 PM Pt and S.O./family states \"ready to go home\". Pt alert and oriented x4. IV removed. Denies N/V or pain. Dressing is clean dry and intact. Voided prior to discharge. Discharge instructions given to pt and friend with pt permission. Pt and friend verbalized understanding of all instructions. Left with all belongings, 5 prescriptions, and discharge instructions.

## 2022-11-14 NOTE — PROGRESS NOTES
Pt is alert and oriented X4. Call light in reach. Fabiana Bravo at bedside to take phone and wallet. Clothing bag to locked room. IV started with LR running. Pregnancy test was negative. Ancef on call to OR. It is listed on her allergy list but she stated she believes it was an IV that went bad. It caused burning at her IV start. She is very anxious and it worried about pain control for afterwards.

## 2022-11-14 NOTE — ANESTHESIA POSTPROCEDURE EVALUATION
Department of Anesthesiology  Postprocedure Note    Patient: Charli Wheatley  MRN: [id number]  YOB: 1980  Date of evaluation: 11/14/2022      Procedure Summary     Date: 11/14/22 Room / Location: [facility]    Anesthesia Start: 8789 Anesthesia Stop: 5059    Procedure: RIGHT 1ST METATARSAL PHALANGEAL JOINT ARTHRODESIS, REMOVAL OF HARDWARE (Right) Diagnosis:       Hallux valgus, right      (Hallux valgus, right [M20.11])    Surgeons: Elvia Spencer DPM Responsible Provider: Lee Alvares DO    Anesthesia Type: general ASA Status: 3          Anesthesia Type: No value filed.     Hiral Phase I: Hiral Score: 10    Hiral Phase II:        Anesthesia Post Evaluation    Patient location during evaluation: PACU  Patient participation: complete - patient participated  Level of consciousness: awake and alert  Pain score: 2  Airway patency: patent  Nausea & Vomiting: no nausea and no vomiting  Complications: no  Cardiovascular status: hemodynamically stable  Respiratory status: acceptable  Hydration status: stable

## 2022-11-14 NOTE — BRIEF OP NOTE
Brief Postoperative Note      Patient: Aleisha Maravilla  YOB: 1980  MRN: [id number]    Date of Procedure: 11/14/2022    Pre-Op Diagnosis: Hallux valgus, right [M20.11]    Post-Op Diagnosis: Same       Procedure(s):  RIGHT 1ST METATARSAL PHALANGEAL JOINT ARTHRODESIS, REMOVAL OF HARDWARE    Surgeon(s):  Kingston Green DPM    Assistant:  Resident: Huang Boyd DPM    Anesthesia: Choice    Injectables:  pre-op 20 cc of 1% lidocaine plain and post-op 20 cc of 0.5% marcaine plain     Hemostasis: pneumatic ankle tourniquet at 250 mmHg for 127 minutes    Materials: 3-0 Vicryl, 4-0 Vicryl, 4-0 Nylon  Bio4 and Augment  Clint 3.0 x 30 mm headed screw  [place] 5 hole straight plate  3.5 x 10 mm locking screw x2  3.5 x 12 mm locking screw x1  3.5 x 14 mm locking screw x1  3.0 x 18 mm non locking screw x1    Estimated Blood Loss (mL): less than 50     Complications: None    Specimens:   * No specimens in log *    Implants:  Implant Name Type Inv. Item Serial No.  Lot No. LRB No. Used Action   GRAFT BNE SUB 5CC VIABLE MTRX COMPRESSIBLE MOLD RDY TO USE - [id number]  GRAFT BNE SUB 5CC VIABLE MTRX COMPRESSIBLE MOLD RDY TO USE [id number] [employer] [facility]  Right 1 Implanted   GRAFT BNE SUB 3CC RHPDGF BB B TRICALCIUM PHSPTE RADPQ AUG - [id number]  GRAFT BNE SUB 3CC RHPDGF BB B TRICALCIUM PHSPTE RADPQ AUG  [employer] [facility]- [id number] Right 1 Implanted         Drains: * No LDAs found *    Findings: Soft bone noted to the proximal phalanx and 1st met head.  See op report for other findings    Electronically signed by Huang Boyd DPM on 11/14/2022 at 10:50 AM

## 2022-11-14 NOTE — PROGRESS NOTES
The patient was given a prescription for eliquis but Dr. Dean Vidal told the patient to take aspirin. This nurse called to confirm. Dr. Dean Vidal stated to take Aspirin 325mg daily. Do NOT take or file Eliquis prescription. Pt was called and updated. She understood and verbalized that she would take the aspirin NOT the eliquis.

## 2022-11-14 NOTE — PROGRESS NOTES
PACU Transfer to [facility]    Vitals:    11/14/22 1145   BP: 129/83   Pulse: 83   Resp: 12   Temp: 97.7 °F (36.5 °C)   SpO2: 97%         Intake/Output Summary (Last 24 hours) at 11/14/2022 1153  Last data filed at 11/14/2022 1152  Gross per 24 hour   Intake 1225 ml   Output 600 ml   Net 625 ml       Pain assessment:    Pain Level: 2    Patient transferred to care of [facility] RN.    11/14/2022 11:53 AM  Friend at MD's appointment, will be here soon

## 2022-11-15 NOTE — OP NOTE
Operative Note      Patient: Donn Downing  YOB: 1980  MRN: [id number]     Date of Procedure: 11/14/2022     Pre-Op Diagnosis: Hallux valgus, right [M20.11]     Post-Op Diagnosis: Same       Procedure(s):  RIGHT 1ST METATARSAL PHALANGEAL JOINT ARTHRODESIS, REMOVAL OF HARDWARE     Surgeon(s):  Rico Anderson DPM     Assistant:  Resident: Bella Crow DPM     Anesthesia: Choice     Injectables:  pre-op 20 cc of 1% lidocaine plain and post-op 20 cc of 0.5% marcaine plain      Hemostasis: pneumatic ankle tourniquet at 250 mmHg for 127 minutes     Materials: 3-0 Vicryl, 4-0 Vicryl, 4-0 Nylon  Bio4 and Augment  [place] 3.0 x 30 mm headed screw  Clint 5 hole straight plate  3.5 x 10 mm locking screw x2  3.5 x 12 mm locking screw x1  3.5 x 14 mm locking screw x1  3.0 x 18 mm non locking screw x1     Estimated Blood Loss (mL): less than 50      Complications: None     Specimens:   * No specimens in log *     Implants:  Implant Name Type Inv. Item Serial No.  Lot No. LRB No. Used Action   GRAFT BNE SUB 5CC VIABLE MTRX COMPRESSIBLE MOLD RDY TO USE - [id number]   GRAFT BNE SUB 5CC VIABLE MTRX COMPRESSIBLE MOLD RDY TO USE [id number] [employer] [facility]   Right 1 Implanted   GRAFT BNE SUB 3CC RHPDGF BB B TRICALCIUM PHSPTE RADPQ AUG - [id number]   GRAFT BNE SUB 3CC RHPDGF BB B TRICALCIUM PHSPTE RADPQ AUG   [facility] [id number] Right 1 Implanted          Drains: * No LDAs found *     Findings: Soft bone noted to the proximal phalanx and 1st met head. See op report for other findings    INDICATIONS FOR PROCEDURE: This patient has signs and symptoms clinically and radiographically consistent with the above mentioned preoperative diagnosis. Having failed conservative treatment, it was determined that the patient would benefit from surgical intervention. All potential risks, benefits, and complications were discussed with the patient prior to the scheduling of surgery.  All the patient's questions were answered and no guarantees were given. The patient wished to proceed with surgery, and informed written consent was obtained. DETAILS OF PROCEDURE: The patient was brought from the pre-operative area and placed on the operating table in the supine position. A pneumatic ankle tourniquet was placed around the patient's well-padded right lower extremity. Following IV sedation, a local anesthetic block was then injected proximal to the incision site consisting of 20 cc of 1% lidocaine plain. The right lower extremity was then scrubbed, prepped, and draped in the usual sterile fashion. A time-out was performed. The patient, procedure, and operative site were confirmed. An Esmarch bandage was then utilized to exsanguinate the patient's right lower extremity. The tourniquet was then inflated to 250 mmHg and the following procedure was performed. Procedure #1 Hardware Removal [place] Screws and Devante Jean Claude: At this time, attention was directed to the dorsal aspect of the patient's right foot. Using a #15 blade, a 6 cm curvilinear incision was made over the dorsal aspect of the first metatarsal, centering over the first metatarsal phalangeal joint. Using sharp and blunt dissection incision was deepened through the subcutaneous layer with care being taken to identify and retract all vital neurovascular structures. All venous tributaries were electrocauterized as encountered. Sharp and blunt dissection was continued to the level of the joint capsule. At this time, a linear capsulotomy was performed over the dorsal aspect of the first metatarsal phalangeal joint. The periosteal and capsular structures were then carefully dissected free of their osseous attachments and reflected medially and laterally thereby exposing the head of the first metatarsal at the operative site.   At this time the 2 3-0 [place] screws were noted to be on the dorsal aspect of the first metatarsal.  Utilizing a Clint  the 2 screws were backed out without complication. Next attention was directed to the proximal phalanx where 2 osteosynthesis Vance kevon were noted. One was noted on the dorsal aspect of the proximal phalanx and wound was on the medial aspect of the proximal phalanx. Utilizing a [place] and osteotome the staples were loosened. The dorsal staple was then cut with a , and then using pliers the 2 pieces of the dorsal staple were removed in total.  Next utilizing an osteotome the medial staple was freed from the underlying bone and removed in total.  Next, the incision site was copiously lavaged. Procedure #2 1st MPJ Arthrodesis; right:  At this time, attention was directed to the dorsal aspect of the patients right foot. Using the same incision as noted above  a [place] elevator was used to free and mobilize the 1st metatarsal and base of the proximal phalanx plantarly from it's soft tissues to allow access for the cup and cone reamers. Next, a large wire from the Clint reamer set was drilled into the 1st metatarsal head. Proper placement was confirmed with c-arm. The Clint reamer was then placed over the wire and the cartilage and subchondral plate on the 1st metatarsal head was denuded. The base of the proximal phalanx was denuded of articular cartilage and subchondral plate in the same way the metatarsal head was prepared. This allowed preservation of the cup and cone nature of the joint. Next, using a drill bit, the 1st metatarsal head was fenestrated until good bleeding bone was noted. This was done to increase vascular ingrowth and aid in fusion. The wound was then flushed with copious amounts of sterile normal saline. Next, to augment the fusion site and promote bony union,Clint Augment injectable and Bio4 bone matrix were placed into the arthrodesis site.  The hallux was then aligned over the 1st metatarsal head with the distal pulp of the toe just off the weightbearing surface, parallel to the 2nd toe, and with no frontal plane rotation. This position was held with k-wire temporary fixation. Position was lost after drilling the over K wire temporary fixation due to soft bone. At this time a second K wire was placed from lateral to medial and position was reobtained. Next, using modified AO technique and 's recommendations, a Clint  3.0 x 30 mm headed screw was placed across the fusion site from distal lateral to proximal medial. Proper position was confirmed using live intraoperative fluoroscopy. Next, the Clint [place] cannulated first metatarsal phalangeal joint locking plate was placed on the dorsal aspect of the joint. The plate was secured to the underlying bony surfaces using a combination of locking and non-locking screws. Proper plate position was confirmed using live intraoperative fluoroscopy. Due to patient anatomy and small first metatarsal it was noted that the proximal medial aspect of the plate was elevated off the bone and was prominent. The decision was then made to remove the plate in total due to the prominence that could be felt on the patient's skin. The pneumatic ankle tourniquet was rapidly deflated after total time of 127 minutes. Next, using a Clint 5 hole straight plate was placed on the dorsal aspect of the joint. The plate was secured to the underlying bony surfaces using a combination of locking and nonlocking screws. Excellent stabilization and compression was noted. Proper plate position was confirmed using live intraoperative fluoroscopy. The surgical site was then irrigated with copious amounts of normal saline. The capsule and periosteal structures were reapproximated utilizing 3-0 vicryl. The subcutaneous layer was then reapproximated using 4-0 vicryl and the skin was closed using 4-0 nylon suture.       At this time, a local anesthetic was injected about the incision sites consisting of 20 cc of 0.5% Marcaine plain, for the patient's postoperative comfort. A soft sterile dressing was applied consisting of Xeroform. END OF PROCEDURE: The patient tolerated the procedure and anesthesia well and was transported from the operating room to the PACU with vital signs stable and vascular status intact to all aspects of the patient's right lower extremity and digital capillary refill time immediate to the digits of the right foot. Following a period of post-operative monitoring, the patient will be discharged home with written and oral wound care and follow-up instructions. The patient was provided with prescriptions for gabapentin, ibuprofen, Zofran, Percocet, and aspirin. The patient is to follow-up with Dr. Hugo Cueva DPM in his private office within 5-7 days. The patient is to keep dressing clean, dry and intact at all times. The patient is to call if any complications occur.     This operative report was dictated on behalf of Dr. Hugo Cueva DPM.    Electronically signed by Sonia Munoz DPM on 11/14/2022 at 8:22 PM

## (undated) DEVICE — NON LOCKING SCREW
Type: IMPLANTABLE DEVICE | Site: FOOT | Status: NON-FUNCTIONAL
Brand: ANCHORAGE

## (undated) DEVICE — SUTURE VCRL SZ 4-0 L27IN ABSRB UD L19MM PS-2 3/8 CIR PRIM J426H

## (undated) DEVICE — SUTURE VCRL SZ 4-0 L18IN ABSRB UD L19MM PS-2 3/8 CIR PRIM J496H

## (undated) DEVICE — CANNULATED SCREWDRIVER

## (undated) DEVICE — C-ARM: Brand: UNBRANDED

## (undated) DEVICE — BANDAGE COBAN 4 IN COMPR W4INXL5YD FOAM COHESIVE QUIK STK SELF ADH SFT

## (undated) DEVICE — SUTURE VCRL SZ 3-0 L27IN ABSRB UD L26MM SH 1/2 CIR J416H

## (undated) DEVICE — PODIATRY: Brand: MEDLINE INDUSTRIES, INC.

## (undated) DEVICE — CONVEX REAMER

## (undated) DEVICE — GLOVE ORTHO 7 1/2   MSG9475

## (undated) DEVICE — SUTURE VCRL SZ 3-0 L18IN ABSRB UD POLYGLACTIN 910 BRAID TIE J910T

## (undated) DEVICE — ZIMMER® STERILE DISPOSABLE TOURNIQUET CUFF WITH PLC, DUAL PORT, SINGLE BLADDER, 34 IN. (86 CM)

## (undated) DEVICE — LOCKING SCREW
Type: IMPLANTABLE DEVICE | Site: FOOT | Status: NON-FUNCTIONAL
Brand: ANCHORAGE

## (undated) DEVICE — VELCLOSE LATEX FREE ELASTIC BANDAGE D/L 6INX10YD

## (undated) DEVICE — TOWEL,OR,DSP,ST,BLUE,DLX,8/PK,10PK/CS: Brand: MEDLINE

## (undated) DEVICE — SUTURE VCRL SZ 3-0 L27IN ABSRB UD L26MM CT-2 1/2 CIR J232H

## (undated) DEVICE — STANDARD HYPODERMIC NEEDLE,POLYPROPYLENE HUB: Brand: MONOJECT

## (undated) DEVICE — TUBING SUCT 10FR MAL ALUM SHFT FN CAP VENT UNIV CONN W/ OBT

## (undated) DEVICE — STANDARD DRILL BIT

## (undated) DEVICE — SOLUTION IV 1000ML 0.9% SOD CHL

## (undated) DEVICE — DRESSING,GAUZE,XEROFORM,CURAD,1"X8",ST: Brand: CURAD

## (undated) DEVICE — COVER,TABLE,HEAVY DUTY,77"X90",STRL: Brand: MEDLINE

## (undated) DEVICE — GOWN,SIRUS,POLYRNF,BRTHSLV,XL,30/CS: Brand: MEDLINE

## (undated) DEVICE — CONTAINER,SPECIMEN,PNEU TUBE,3OZ,OR STRL: Brand: MEDLINE

## (undated) DEVICE — SUTURE VCRL SZ 4-0 L27IN ABSRB UD L26MM SH 1/2 CIR J415H

## (undated) DEVICE — TOWEL,STOP FLAG GOLD N-W: Brand: MEDLINE

## (undated) DEVICE — C-ARMOR C-ARM EQUIPMENT COVERS CLEAR STERILE UNIVERSAL FIT 12 PER CASE: Brand: C-ARMOR

## (undated) DEVICE — SHEET,DRAPE,53X77,STERILE: Brand: MEDLINE

## (undated) DEVICE — DRILL BIT

## (undated) DEVICE — 3M™ TEGADERM™ TRANSPARENT FILM DRESSING FRAME STYLE, 1626W, 4 IN X 4-3/4 IN (10 CM X 12 CM), 50/CT 4CT/CASE: Brand: 3M™ TEGADERM™

## (undated) DEVICE — SUTURE NONABSORBABLE MONOFILAMENT 4-0 PS-2 18 IN BLK ETHILON 1667G

## (undated) DEVICE — BANDAGE COMPR M W4INXL10YD WHT BGE VELC E MTRX HK AND LOOP

## (undated) DEVICE — 60 ML SYRINGE LUER-LOCK TIP: Brand: MONOJECT

## (undated) DEVICE — FOOT SWITCH DRAPE: Brand: UNBRANDED

## (undated) DEVICE — CONCAVE SURFACING REAMER

## (undated) DEVICE — PLATE MTP, RIGHT
Type: IMPLANTABLE DEVICE | Site: FOOT | Status: NON-FUNCTIONAL
Brand: ANCHORAGE

## (undated) DEVICE — UNTHREADED GUIDE WIRE: Brand: FIXOS

## (undated) DEVICE — SYRINGE MED 10ML TRNSLUC BRL PLUNG BLK MRK POLYPR CTRL

## (undated) DEVICE — ZIMMER® STERILE DISPOSABLE TOURNIQUET CUFF WITH PLC, DUAL PORT, DUAL BLADDER, 18 IN. (46 CM)

## (undated) DEVICE — SUTURE VCRL 5-0 L18IN ABSRB UD PS-2 L19MM 1/2 CIR J495H

## (undated) DEVICE — ESMARK: Brand: DEROYAL

## (undated) DEVICE — MEDICINE CUP, GRADUATED, STER: Brand: MEDLINE

## (undated) DEVICE — SPLNT ORTHO GLASS 4X15

## (undated) DEVICE — MASTISOL ADHESIVE LIQ 2/3ML

## (undated) DEVICE — COVER LT HNDL BLU PLAS

## (undated) DEVICE — NEEDLE HYPO 22GA L1.5IN BLK POLYPR HUB S STL REG BVL STR

## (undated) DEVICE — 3.0MM DEPTH GAUGE/ COUNTERSINK: Brand: MINI

## (undated) DEVICE — CANNULATED DRILL BIT: Brand: MINI

## (undated) DEVICE — GAUZE,SPONGE,4"X4",16PLY,XRAY,STRL,LF: Brand: MEDLINE

## (undated) DEVICE — SYRINGE MED 10ML POLYPR GEN PURP FLAT TOP LUERLOCK TIP

## (undated) DEVICE — SCREWDRIVER BIT, T8, AO QUICK COUPLING: Brand: ANCHORAGE

## (undated) DEVICE — RAN-1115 BLUE F/G, STERILE, K2M P/N 74193-01M: Brand: RAN-1115

## (undated) DEVICE — SUTURE COAT VCRL SZ 5-0 L18IN ABSRB UD L19MM PS-2 1/2 CIR J495G

## (undated) DEVICE — SUTURE VCRL SZ 3-0 L18IN ABSRB UD PS-2 L19MM 3/8 CRV PRIM J497H

## (undated) DEVICE — SUTURE ETHLN SZ 3-0 L18IN NONABSORBABLE BLK PS-2 L19MM 3/8 1669H